# Patient Record
Sex: MALE | Race: WHITE | NOT HISPANIC OR LATINO | URBAN - METROPOLITAN AREA
[De-identification: names, ages, dates, MRNs, and addresses within clinical notes are randomized per-mention and may not be internally consistent; named-entity substitution may affect disease eponyms.]

---

## 2019-08-09 ENCOUNTER — EMERGENCY (EMERGENCY)
Facility: HOSPITAL | Age: 69
LOS: 1 days | Discharge: HOME | End: 2019-08-09
Attending: STUDENT IN AN ORGANIZED HEALTH CARE EDUCATION/TRAINING PROGRAM
Payer: COMMERCIAL

## 2019-08-09 VITALS
RESPIRATION RATE: 18 BRPM | HEIGHT: 69 IN | HEART RATE: 92 BPM | OXYGEN SATURATION: 98 % | SYSTOLIC BLOOD PRESSURE: 133 MMHG | DIASTOLIC BLOOD PRESSURE: 91 MMHG | WEIGHT: 169.98 LBS | TEMPERATURE: 98 F

## 2019-08-09 VITALS
SYSTOLIC BLOOD PRESSURE: 130 MMHG | RESPIRATION RATE: 20 BRPM | HEART RATE: 78 BPM | OXYGEN SATURATION: 99 % | DIASTOLIC BLOOD PRESSURE: 92 MMHG

## 2019-08-09 LAB
ALBUMIN SERPL ELPH-MCNC: 4 G/DL — SIGNIFICANT CHANGE UP (ref 3.5–5)
ALP SERPL-CCNC: 188 U/L — HIGH (ref 40–120)
ALT FLD-CCNC: 246 U/L DA — HIGH (ref 10–60)
ANION GAP SERPL CALC-SCNC: 10 MMOL/L — SIGNIFICANT CHANGE UP (ref 5–17)
AST SERPL-CCNC: 170 U/L — HIGH (ref 10–40)
BASOPHILS # BLD AUTO: 0.03 K/UL — SIGNIFICANT CHANGE UP (ref 0–0.2)
BASOPHILS NFR BLD AUTO: 0.4 % — SIGNIFICANT CHANGE UP (ref 0–2)
BILIRUB SERPL-MCNC: 4.7 MG/DL — HIGH (ref 0.2–1.2)
BUN SERPL-MCNC: 14 MG/DL — SIGNIFICANT CHANGE UP (ref 7–18)
CALCIUM SERPL-MCNC: 9.1 MG/DL — SIGNIFICANT CHANGE UP (ref 8.4–10.5)
CHLORIDE SERPL-SCNC: 101 MMOL/L — SIGNIFICANT CHANGE UP (ref 96–108)
CO2 SERPL-SCNC: 24 MMOL/L — SIGNIFICANT CHANGE UP (ref 22–31)
CREAT SERPL-MCNC: 1.42 MG/DL — HIGH (ref 0.5–1.3)
EOSINOPHIL # BLD AUTO: 0.35 K/UL — SIGNIFICANT CHANGE UP (ref 0–0.5)
EOSINOPHIL NFR BLD AUTO: 4.7 % — SIGNIFICANT CHANGE UP (ref 0–6)
GLUCOSE SERPL-MCNC: 99 MG/DL — SIGNIFICANT CHANGE UP (ref 70–99)
HCT VFR BLD CALC: 44.3 % — SIGNIFICANT CHANGE UP (ref 39–50)
HGB BLD-MCNC: 15.4 G/DL — SIGNIFICANT CHANGE UP (ref 13–17)
IMM GRANULOCYTES NFR BLD AUTO: 0.4 % — SIGNIFICANT CHANGE UP (ref 0–1.5)
LIDOCAIN IGE QN: 130 U/L — SIGNIFICANT CHANGE UP (ref 73–393)
LYMPHOCYTES # BLD AUTO: 0.67 K/UL — LOW (ref 1–3.3)
LYMPHOCYTES # BLD AUTO: 9 % — LOW (ref 13–44)
MCHC RBC-ENTMCNC: 31.4 PG — SIGNIFICANT CHANGE UP (ref 27–34)
MCHC RBC-ENTMCNC: 34.8 GM/DL — SIGNIFICANT CHANGE UP (ref 32–36)
MCV RBC AUTO: 90.2 FL — SIGNIFICANT CHANGE UP (ref 80–100)
MONOCYTES # BLD AUTO: 0.74 K/UL — SIGNIFICANT CHANGE UP (ref 0–0.9)
MONOCYTES NFR BLD AUTO: 9.9 % — SIGNIFICANT CHANGE UP (ref 2–14)
NEUTROPHILS # BLD AUTO: 5.65 K/UL — SIGNIFICANT CHANGE UP (ref 1.8–7.4)
NEUTROPHILS NFR BLD AUTO: 75.6 % — SIGNIFICANT CHANGE UP (ref 43–77)
NRBC # BLD: 0 /100 WBCS — SIGNIFICANT CHANGE UP (ref 0–0)
PLATELET # BLD AUTO: 144 K/UL — LOW (ref 150–400)
POTASSIUM SERPL-MCNC: 3.8 MMOL/L — SIGNIFICANT CHANGE UP (ref 3.5–5.3)
POTASSIUM SERPL-SCNC: 3.8 MMOL/L — SIGNIFICANT CHANGE UP (ref 3.5–5.3)
PROT SERPL-MCNC: 8.2 G/DL — SIGNIFICANT CHANGE UP (ref 6–8.3)
RBC # BLD: 4.91 M/UL — SIGNIFICANT CHANGE UP (ref 4.2–5.8)
RBC # FLD: 12.6 % — SIGNIFICANT CHANGE UP (ref 10.3–14.5)
SODIUM SERPL-SCNC: 135 MMOL/L — SIGNIFICANT CHANGE UP (ref 135–145)
WBC # BLD: 7.47 K/UL — SIGNIFICANT CHANGE UP (ref 3.8–10.5)
WBC # FLD AUTO: 7.47 K/UL — SIGNIFICANT CHANGE UP (ref 3.8–10.5)

## 2019-08-09 PROCEDURE — 85027 COMPLETE CBC AUTOMATED: CPT

## 2019-08-09 PROCEDURE — 99284 EMERGENCY DEPT VISIT MOD MDM: CPT | Mod: 25

## 2019-08-09 PROCEDURE — 83690 ASSAY OF LIPASE: CPT

## 2019-08-09 PROCEDURE — 76705 ECHO EXAM OF ABDOMEN: CPT | Mod: 26

## 2019-08-09 PROCEDURE — 80053 COMPREHEN METABOLIC PANEL: CPT

## 2019-08-09 PROCEDURE — 99284 EMERGENCY DEPT VISIT MOD MDM: CPT

## 2019-08-09 PROCEDURE — 36415 COLL VENOUS BLD VENIPUNCTURE: CPT

## 2019-08-09 PROCEDURE — 76705 ECHO EXAM OF ABDOMEN: CPT

## 2019-08-09 RX ORDER — ROSUVASTATIN CALCIUM 5 MG/1
0 TABLET ORAL
Qty: 0 | Refills: 0 | DISCHARGE

## 2019-08-09 RX ORDER — DOLUTEGRAVIR SODIUM 25 MG/1
0 TABLET, FILM COATED ORAL
Qty: 0 | Refills: 0 | DISCHARGE

## 2019-08-09 RX ORDER — CLONAZEPAM 1 MG
0 TABLET ORAL
Qty: 0 | Refills: 0 | DISCHARGE

## 2019-08-09 RX ORDER — QUETIAPINE FUMARATE 200 MG/1
0 TABLET, FILM COATED ORAL
Qty: 0 | Refills: 0 | DISCHARGE

## 2019-08-09 RX ORDER — EMTRICITABINE AND TENOFOVIR DISOPROXIL FUMARATE 200; 300 MG/1; MG/1
0 TABLET, FILM COATED ORAL
Qty: 0 | Refills: 0 | DISCHARGE

## 2019-08-09 NOTE — ED PROVIDER NOTE - PROGRESS NOTE DETAILS
patient lft elevated and dilated cbd. no jaundice on exam. patient abd remains soft, nontender. patient endorses that he noted a stone on his last bm when hisabd pain resolved. likely passed cbd stone. instructed to f.u gi. return precaution for jaudice, fever, n, v, or worsening pain instructed

## 2019-08-09 NOTE — ED PROVIDER NOTE - OBJECTIVE STATEMENT
70 y/o male with PMHx of HLD presents to the ED c/o RUQ abd pain x 3 days. Pt notes associated nausea, constipation and fever (t-max 100). Pt notes pain is worse with eating. Pt able to pass gas. No prior abd surgeries. Pt denies vomiting, diarrhea, cough, or any other complaints. NKDA.

## 2021-09-13 ENCOUNTER — TELEPHONE (OUTPATIENT)
Dept: UROLOGY | Facility: MEDICAL CENTER | Age: 71
End: 2021-09-13

## 2021-09-13 ENCOUNTER — APPOINTMENT (EMERGENCY)
Dept: CT IMAGING | Facility: HOSPITAL | Age: 71
End: 2021-09-13
Payer: MEDICARE

## 2021-09-13 ENCOUNTER — HOSPITAL ENCOUNTER (EMERGENCY)
Facility: HOSPITAL | Age: 71
Discharge: HOME | End: 2021-09-13
Attending: EMERGENCY MEDICINE
Payer: MEDICARE

## 2021-09-13 VITALS
TEMPERATURE: 98.9 F | SYSTOLIC BLOOD PRESSURE: 157 MMHG | WEIGHT: 168 LBS | DIASTOLIC BLOOD PRESSURE: 90 MMHG | HEART RATE: 86 BPM | OXYGEN SATURATION: 98 % | RESPIRATION RATE: 19 BRPM

## 2021-09-13 DIAGNOSIS — N23 RENAL COLIC ON RIGHT SIDE: ICD-10-CM

## 2021-09-13 DIAGNOSIS — R10.9 RIGHT FLANK PAIN: Primary | ICD-10-CM

## 2021-09-13 DIAGNOSIS — K59.00 CONSTIPATION: ICD-10-CM

## 2021-09-13 LAB
ANION GAP SERPL CALCULATED.3IONS-SCNC: 11 MMOL/L (ref 4–13)
APTT PPP: 28 SECONDS (ref 23–37)
BACTERIA UR QL AUTO: ABNORMAL /HPF
BASOPHILS # BLD AUTO: 0.06 THOUSANDS/ΜL (ref 0–0.1)
BASOPHILS NFR BLD AUTO: 1 % (ref 0–1)
BILIRUB UR QL STRIP: NEGATIVE
BUN SERPL-MCNC: 11 MG/DL (ref 5–25)
CALCIUM SERPL-MCNC: 9.2 MG/DL (ref 8.3–10.1)
CHLORIDE SERPL-SCNC: 97 MMOL/L (ref 100–108)
CLARITY UR: CLEAR
CO2 SERPL-SCNC: 27 MMOL/L (ref 21–32)
COLOR UR: YELLOW
CREAT SERPL-MCNC: 1.2 MG/DL (ref 0.6–1.3)
EOSINOPHIL # BLD AUTO: 0.01 THOUSAND/ΜL (ref 0–0.61)
EOSINOPHIL NFR BLD AUTO: 0 % (ref 0–6)
ERYTHROCYTE [DISTWIDTH] IN BLOOD BY AUTOMATED COUNT: 13.1 % (ref 11.6–15.1)
GFR SERPL CREATININE-BSD FRML MDRD: 60 ML/MIN/1.73SQ M
GLUCOSE SERPL-MCNC: 110 MG/DL (ref 65–140)
GLUCOSE UR STRIP-MCNC: NEGATIVE MG/DL
HCT VFR BLD AUTO: 44.8 % (ref 36.5–49.3)
HGB BLD-MCNC: 15.5 G/DL (ref 12–17)
HGB UR QL STRIP.AUTO: ABNORMAL
IMM GRANULOCYTES # BLD AUTO: 0.03 THOUSAND/UL (ref 0–0.2)
IMM GRANULOCYTES NFR BLD AUTO: 0 % (ref 0–2)
INR PPP: 1.1 (ref 0.84–1.19)
KETONES UR STRIP-MCNC: NEGATIVE MG/DL
LEUKOCYTE ESTERASE UR QL STRIP: ABNORMAL
LYMPHOCYTES # BLD AUTO: 1.14 THOUSANDS/ΜL (ref 0.6–4.47)
LYMPHOCYTES NFR BLD AUTO: 14 % (ref 14–44)
MCH RBC QN AUTO: 31.3 PG (ref 26.8–34.3)
MCHC RBC AUTO-ENTMCNC: 34.6 G/DL (ref 31.4–37.4)
MCV RBC AUTO: 90 FL (ref 82–98)
MONOCYTES # BLD AUTO: 0.77 THOUSAND/ΜL (ref 0.17–1.22)
MONOCYTES NFR BLD AUTO: 10 % (ref 4–12)
MUCOUS THREADS UR QL AUTO: ABNORMAL
NEUTROPHILS # BLD AUTO: 6.11 THOUSANDS/ΜL (ref 1.85–7.62)
NEUTS SEG NFR BLD AUTO: 75 % (ref 43–75)
NITRITE UR QL STRIP: NEGATIVE
NON-SQ EPI CELLS URNS QL MICRO: ABNORMAL /HPF
NRBC BLD AUTO-RTO: 0 /100 WBCS
PH UR STRIP.AUTO: 6 [PH]
PLATELET # BLD AUTO: 210 THOUSANDS/UL (ref 149–390)
PMV BLD AUTO: 8.8 FL (ref 8.9–12.7)
POTASSIUM SERPL-SCNC: 3.9 MMOL/L (ref 3.5–5.3)
PROT UR STRIP-MCNC: NEGATIVE MG/DL
PROTHROMBIN TIME: 13.7 SECONDS (ref 11.6–14.5)
RBC # BLD AUTO: 4.96 MILLION/UL (ref 3.88–5.62)
RBC #/AREA URNS AUTO: ABNORMAL /HPF
SODIUM SERPL-SCNC: 135 MMOL/L (ref 136–145)
SP GR UR STRIP.AUTO: 1.01 (ref 1–1.03)
UROBILINOGEN UR QL STRIP.AUTO: 0.2 E.U./DL
WBC # BLD AUTO: 8.12 THOUSAND/UL (ref 4.31–10.16)
WBC #/AREA URNS AUTO: ABNORMAL /HPF

## 2021-09-13 PROCEDURE — 85025 COMPLETE CBC W/AUTO DIFF WBC: CPT | Performed by: PHYSICIAN ASSISTANT

## 2021-09-13 PROCEDURE — 96361 HYDRATE IV INFUSION ADD-ON: CPT

## 2021-09-13 PROCEDURE — 96374 THER/PROPH/DIAG INJ IV PUSH: CPT

## 2021-09-13 PROCEDURE — 99285 EMERGENCY DEPT VISIT HI MDM: CPT | Performed by: PHYSICIAN ASSISTANT

## 2021-09-13 PROCEDURE — 80048 BASIC METABOLIC PNL TOTAL CA: CPT | Performed by: PHYSICIAN ASSISTANT

## 2021-09-13 PROCEDURE — 85610 PROTHROMBIN TIME: CPT | Performed by: PHYSICIAN ASSISTANT

## 2021-09-13 PROCEDURE — 99284 EMERGENCY DEPT VISIT MOD MDM: CPT

## 2021-09-13 PROCEDURE — 85730 THROMBOPLASTIN TIME PARTIAL: CPT | Performed by: PHYSICIAN ASSISTANT

## 2021-09-13 PROCEDURE — 36415 COLL VENOUS BLD VENIPUNCTURE: CPT | Performed by: PHYSICIAN ASSISTANT

## 2021-09-13 PROCEDURE — 81001 URINALYSIS AUTO W/SCOPE: CPT | Performed by: PHYSICIAN ASSISTANT

## 2021-09-13 PROCEDURE — 96375 TX/PRO/DX INJ NEW DRUG ADDON: CPT

## 2021-09-13 PROCEDURE — 74177 CT ABD & PELVIS W/CONTRAST: CPT

## 2021-09-13 RX ORDER — LACTULOSE 20 G/30ML
20 SOLUTION ORAL 2 TIMES DAILY PRN
Qty: 300 ML | Refills: 0 | Status: SHIPPED | OUTPATIENT
Start: 2021-09-13

## 2021-09-13 RX ORDER — OXYCODONE HYDROCHLORIDE AND ACETAMINOPHEN 5; 325 MG/1; MG/1
1 TABLET ORAL EVERY 6 HOURS PRN
Qty: 12 TABLET | Refills: 0 | Status: SHIPPED | OUTPATIENT
Start: 2021-09-13 | End: 2021-09-16

## 2021-09-13 RX ORDER — OXYCODONE HYDROCHLORIDE AND ACETAMINOPHEN 5; 325 MG/1; MG/1
1 TABLET ORAL EVERY 6 HOURS PRN
Qty: 12 TABLET | Refills: 0 | Status: SHIPPED | OUTPATIENT
Start: 2021-09-13 | End: 2021-09-13 | Stop reason: ALTCHOICE

## 2021-09-13 RX ORDER — FENTANYL CITRATE 50 UG/ML
50 INJECTION, SOLUTION INTRAMUSCULAR; INTRAVENOUS ONCE
Status: COMPLETED | OUTPATIENT
Start: 2021-09-13 | End: 2021-09-13

## 2021-09-13 RX ORDER — ONDANSETRON 4 MG/1
4 TABLET, ORALLY DISINTEGRATING ORAL EVERY 6 HOURS PRN
Qty: 20 TABLET | Refills: 0 | Status: SHIPPED | OUTPATIENT
Start: 2021-09-13

## 2021-09-13 RX ORDER — ONDANSETRON 2 MG/ML
4 INJECTION INTRAMUSCULAR; INTRAVENOUS ONCE
Status: COMPLETED | OUTPATIENT
Start: 2021-09-13 | End: 2021-09-13

## 2021-09-13 RX ORDER — KETOROLAC TROMETHAMINE 30 MG/ML
30 INJECTION, SOLUTION INTRAMUSCULAR; INTRAVENOUS ONCE
Status: COMPLETED | OUTPATIENT
Start: 2021-09-13 | End: 2021-09-13

## 2021-09-13 RX ORDER — IBUPROFEN 400 MG/1
400 TABLET ORAL EVERY 6 HOURS PRN
Qty: 30 TABLET | Refills: 0 | Status: SHIPPED | OUTPATIENT
Start: 2021-09-13 | End: 2021-09-18

## 2021-09-13 RX ADMIN — KETOROLAC TROMETHAMINE 30 MG: 30 INJECTION, SOLUTION INTRAMUSCULAR; INTRAVENOUS at 11:28

## 2021-09-13 RX ADMIN — ONDANSETRON 4 MG: 2 INJECTION INTRAMUSCULAR; INTRAVENOUS at 11:27

## 2021-09-13 RX ADMIN — FENTANYL CITRATE 50 MCG: 50 INJECTION, SOLUTION INTRAMUSCULAR; INTRAVENOUS at 13:38

## 2021-09-13 RX ADMIN — IOHEXOL 100 ML: 350 INJECTION, SOLUTION INTRAVENOUS at 12:21

## 2021-09-13 RX ADMIN — SODIUM CHLORIDE 1000 ML: 0.9 INJECTION, SOLUTION INTRAVENOUS at 11:31

## 2021-09-13 NOTE — TELEPHONE ENCOUNTER
Patient seen in the ER today, CT showing non-obstructing right renal stone  Appropriate for next available new patient with AP  Please contact to schedule

## 2021-09-13 NOTE — ED PROVIDER NOTES
History  Chief Complaint   Patient presents with    Flank Pain     R flank pain x 3 weeks  Hx of kidney stones  Also c/o decreased urine output      70 y o  male with past medical history significant for kidney stones presents to ED with chief complaint of right flank pain  Onset of symptoms reported as 3 weeks ago  Location of symptoms reported as intermittent   Quality is reported as dull aching pain  Severity is reported as moderate  Associated symptoms: positive for nausea, denies vomiting, denies fevers  Denies urinary retention, positive for urinary hesitancy  Denies hematuria or dysuria  Positive for constipation   Modifying factors: tried ibuprofen at home without relief  Context: patient reports a history of kidney stones in the past   Reports he thinks he passed a stone 2-3 weeks ago but has be experiencing intermittent right flank pain for the past 3 weeks  Reports no past abdominal surgical history  Previously followed with urologist in [facility] but recently relocated to [place] and has no one established at this time  Previously has ureteral stent placed 8 years ago  Reports he has been told that he has renal cysts in the past    Reviewed past medical history and visits via EPIC:  No prior visits to this ed  History provided by:  Patient   used: No    Flank Pain  Associated symptoms: constipation    Associated symptoms: no chest pain, no chills, no cough, no diarrhea, no dysuria, no fatigue, no fever, no hematuria, no nausea, no shortness of breath, no sore throat and no vomiting        None       History reviewed  No pertinent past medical history  History reviewed  No pertinent surgical history  History reviewed  No pertinent family history  I have reviewed and agree with the history as documented      E-Cigarette/Vaping     E-Cigarette/Vaping Substances     Social History     Tobacco Use    Smoking status: Never Smoker    Smokeless tobacco: Never Used   Substance Use Topics    Alcohol use: Never    Drug use: Never       Review of Systems   Constitutional: Negative for activity change, appetite change, chills, diaphoresis, fatigue, fever and unexpected weight change  HENT: Negative for congestion, dental problem, drooling, ear discharge, ear pain, facial swelling, hearing loss, mouth sores, nosebleeds, postnasal drip, rhinorrhea, sinus pressure, sinus pain, sneezing, sore throat, tinnitus, trouble swallowing and voice change  Eyes: Negative for photophobia, pain, discharge, redness, itching and visual disturbance  Respiratory: Negative for cough, choking, chest tightness, shortness of breath, wheezing and stridor  Cardiovascular: Negative for chest pain, palpitations and leg swelling  Gastrointestinal: Positive for constipation  Negative for abdominal distention, abdominal pain, anal bleeding, blood in stool, diarrhea, nausea, rectal pain and vomiting  Endocrine: Negative for cold intolerance, heat intolerance, polydipsia, polyphagia and polyuria  Genitourinary: Positive for decreased urine volume and flank pain  Negative for difficulty urinating, dysuria, frequency, hematuria, penile swelling, scrotal swelling, testicular pain and urgency  Musculoskeletal: Negative for arthralgias, back pain, gait problem, joint swelling, myalgias, neck pain and neck stiffness  Skin: Negative for color change, pallor, rash and wound  Allergic/Immunologic: Negative for environmental allergies, food allergies and immunocompromised state  Neurological: Negative for dizziness, tremors, seizures, syncope, facial asymmetry, speech difficulty, weakness, light-headedness, numbness and headaches  Hematological: Negative for adenopathy  Does not bruise/bleed easily  Psychiatric/Behavioral: Negative for agitation, confusion and hallucinations  The patient is not nervous/anxious  All other systems reviewed and are negative        Physical Exam  Physical Exam  Vitals and nursing note reviewed  Constitutional:       General: He is not in acute distress  Appearance: Normal appearance  Comments: /79 (BP Location: Left arm)   Pulse (!) 113   Temp 98 9 °F (37 2 °C)   Resp 19   SpO2 98%      HENT:      Head: Normocephalic and atraumatic  Right Ear: External ear normal       Left Ear: External ear normal       Nose: Nose normal    Eyes:      General: No scleral icterus  Right eye: No discharge  Left eye: No discharge  Extraocular Movements: Extraocular movements intact  Conjunctiva/sclera: Conjunctivae normal    Cardiovascular:      Rate and Rhythm: Tachycardia present  Pulses: Normal pulses  Pulmonary:      Effort: Pulmonary effort is normal  No respiratory distress  Abdominal:      Tenderness: There is no abdominal tenderness  There is right CVA tenderness  There is no guarding or rebound  Musculoskeletal:         General: No swelling, tenderness, deformity or signs of injury  Normal range of motion  Cervical back: Normal range of motion and neck supple  No rigidity or tenderness  Skin:     Capillary Refill: Capillary refill takes less than 2 seconds  Coloration: Skin is not jaundiced or pale  Findings: No erythema or rash  Neurological:      General: No focal deficit present  Mental Status: He is alert and oriented to person, place, and time  Mental status is at baseline  Cranial Nerves: No cranial nerve deficit  Sensory: No sensory deficit  Motor: No weakness        Gait: Gait normal    Psychiatric:         Mood and Affect: Mood normal          Behavior: Behavior normal          Vital Signs  ED Triage Vitals [09/13/21 0950]   Temperature Pulse Respirations Blood Pressure SpO2   98 9 °F (37 2 °C) (!) 113 19 140/79 98 %      Temp src Heart Rate Source Patient Position - Orthostatic VS BP Location FiO2 (%)   -- -- Sitting Left arm --      Pain Score       6           Vitals:    09/13/21 0950 09/13/21 1130 09/13/21 1345   BP: 140/79 126/97 168/92   Pulse: (!) 113 94 88   Patient Position - Orthostatic VS: Sitting           Visual Acuity      ED Medications  Medications   ketorolac (TORADOL) injection 30 mg (30 mg Intravenous Given 9/13/21 1128)   ondansetron (ZOFRAN) injection 4 mg (4 mg Intravenous Given 9/13/21 1127)   sodium chloride 0 9 % bolus 1,000 mL (0 mL Intravenous Stopped 9/13/21 1333)   iohexol (OMNIPAQUE) 350 MG/ML injection (SINGLE-DOSE) 100 mL (100 mL Intravenous Given 9/13/21 1221)   fentanyl citrate (PF) 100 MCG/2ML 50 mcg (50 mcg Intravenous Given 9/13/21 1338)       Diagnostic Studies  Results Reviewed     Procedure Component Value Units Date/Time    Urine Microscopic [[id number]]  (Abnormal) Collected: 09/13/21 1121    Lab Status: Final result Specimen: Urine, Clean Catch Updated: 09/13/21 1156     RBC, UA 20-30 /hpf      WBC, UA 2-4 /hpf      Epithelial Cells Occasional /hpf      Bacteria, UA Occasional /hpf      MUCUS THREADS Occasional    Basic metabolic panel [[id number]]  (Abnormal) Collected: 09/13/21 1127    Lab Status: Final result Specimen: Blood from Arm, Right Updated: 09/13/21 1152     Sodium 135 mmol/L      Potassium 3 9 mmol/L      Chloride 97 mmol/L      CO2 27 mmol/L      ANION GAP 11 mmol/L      BUN 11 mg/dL      Creatinine 1 20 mg/dL      Glucose 110 mg/dL      Calcium 9 2 mg/dL      eGFR 60 ml/min/1 73sq m     Narrative:      Sandra guidelines for Chronic Kidney Disease (CKD):     Stage 1 with normal or high GFR (GFR > 90 mL/min/1 73 square meters)    Stage 2 Mild CKD (GFR = 60-89 mL/min/1 73 square meters)    Stage 3A Moderate CKD (GFR = 45-59 mL/min/1 73 square meters)    Stage 3B Moderate CKD (GFR = 30-44 mL/min/1 73 square meters)    Stage 4 Severe CKD (GFR = 15-29 mL/min/1 73 square meters)    Stage 5 End Stage CKD (GFR <15 mL/min/1 73 square meters)  Note: GFR calculation is accurate only with a steady state creatinine Protime-INR [[id number]]  (Normal) Collected: 09/13/21 1127    Lab Status: Final result Specimen: Blood from Arm, Right Updated: 09/13/21 1149     Protime 13 7 seconds      INR 1 10    APTT [[id number]]  (Normal) Collected: 09/13/21 1127    Lab Status: Final result Specimen: Blood from Arm, Right Updated: 09/13/21 1149     PTT 28 seconds     CBC and differential [[id number]]  (Abnormal) Collected: 09/13/21 1127    Lab Status: Final result Specimen: Blood from Arm, Right Updated: 09/13/21 1135     WBC 8 12 Thousand/uL      RBC 4 96 Million/uL      Hemoglobin 15 5 g/dL      Hematocrit 44 8 %      MCV 90 fL      MCH 31 3 pg      MCHC 34 6 g/dL      RDW 13 1 %      MPV 8 8 fL      Platelets 674 Thousands/uL      nRBC 0 /100 WBCs      Neutrophils Relative 75 %      Immat GRANS % 0 %      Lymphocytes Relative 14 %      Monocytes Relative 10 %      Eosinophils Relative 0 %      Basophils Relative 1 %      Neutrophils Absolute 6 11 Thousands/µL      Immature Grans Absolute 0 03 Thousand/uL      Lymphocytes Absolute 1 14 Thousands/µL      Monocytes Absolute 0 77 Thousand/µL      Eosinophils Absolute 0 01 Thousand/µL      Basophils Absolute 0 06 Thousands/µL     UA w Reflex to Microscopic w Reflex to Culture [[id number]]  (Abnormal) Collected: 09/13/21 1121    Lab Status: Final result Specimen: Urine, Clean Catch Updated: 09/13/21 1128     Color, UA Yellow     Clarity, UA Clear     Specific Gravity, UA 1 015     pH, UA 6 0     Leukocytes, UA Trace     Nitrite, UA Negative     Protein, UA Negative mg/dl      Glucose, UA Negative mg/dl      Ketones, UA Negative mg/dl      Urobilinogen, UA 0 2 E U /dl      Bilirubin, UA Negative     Blood, UA Large                 CT abdomen pelvis with contrast   Final Result by Mario Feliciano DO (09/13 1328)      No findings to explain patient's pain  Nonobstructing right renal calculi, diverticulosis, cholelithiasis, and additional findings as above        Workstation performed: [id number] Procedures  Procedures         ED Course  ED Course as of Sep 13 1406   Mon Sep 13, 2021   1136 Reviewed  CBC demonstrates white blood cell count normal at 8 1, hemoglobin of 15 hematocrit 44 normal   No anemia  CBC and differential(!)   1136 Urinalysis reviewed  Trace leukocytes  Large blood  Negative for nitrites  UA w Reflex to Microscopic w Reflex to Culture(!)   1212 BUN of 11 creatinine 1 20 normal   No renal failure  Basic metabolic panel(!)   5678 INR 1 0  No coagulopathy  Protime-INR                             SBIRT 20yo+      Most Recent Value   SBIRT (22 yo +)   In order to provide better care to our patients, we are screening all of our patients for alcohol and drug use  Would it be okay to ask you these screening questions? Yes Filed at: 09/13/2021 1132   Initial Alcohol Screen: US AUDIT-C    1  How often do you have a drink containing alcohol?  0 Filed at: 09/13/2021 1132   2  How many drinks containing alcohol do you have on a typical day you are drinking? 0 Filed at: 09/13/2021 1132   3a  Male UNDER 65: How often do you have five or more drinks on one occasion? 0 Filed at: 09/13/2021 1132   3b  FEMALE Any Age, or MALE 65+: How often do you have 4 or more drinks on one occassion? 0 Filed at: 09/13/2021 1132   Audit-C Score  0 Filed at: 09/13/2021 1132   PHANI: How many times in the past year have you    Used an illegal drug or used a prescription medication for non-medical reasons? Never Filed at: 09/13/2021 1132                    MDM  Number of Diagnoses or Management Options  Constipation: new and requires workup  Renal colic on right side: new and requires workup  Right flank pain: new and requires workup  Diagnosis management comments: MDM    ddx includes but is not limited to pyelonephritis, kidney stone, intestinal sources including diverticulitis or appendicitis, biliary colic, cholecystitis, mesenteric ischemia, shingles, urinary tract infection, musculoskeletal back pain, consider ectopic pregnancy, ovarian torsion, dysmenorrhea, or ovarian cyst    Ct abd/pelvis images independently visualized and interpreted by me  Radiology report reviewed: ABDOMEN     LOWER CHEST:  No clinically significant abnormality identified in the visualized lower chest   Coronary artery calcification  LIVER/BILIARY TREE:  One or more subcentimeter sharply circumscribed low-density hepatic lesion(s) are noted, too small to accurately characterize, but statistically most likely to represent subcentimeter hepatic cysts   No suspicious solid hepatic   lesion is identified   Hepatic contours are normal   No biliary dilatation  GALLBLADDER:  There are gallstone(s) within the gallbladder, without pericholecystic inflammatory changes  SPLEEN:  Unremarkable  PANCREAS:  Unremarkable  ADRENAL GLANDS:  Unremarkable  KIDNEYS/URETERS:  6 mm right lower pole renal calculus   Additional smaller renal calculi   No hydronephrosis   Greater than 10 cm left lower pole renal cyst   Additional bilateral cysts and too small to accurately characterize hypodensities  STOMACH AND BOWEL: Garlan Cal is colonic diverticulosis without evidence of acute diverticulitis   Small hiatal hernia  APPENDIX:  No findings to suggest appendicitis  ABDOMINOPELVIC CAVITY:  No ascites   No pneumoperitoneum   No lymphadenopathy  VESSELS:  Atherosclerotic changes are present   No evidence of aneurysm  PELVIS     REPRODUCTIVE ORGANS:  The prostate is enlarged        URINARY BLADDER:  Unremarkable  ABDOMINAL WALL/INGUINAL REGIONS:  Bilateral fat-containing inguinal hernias   Small lipoma in the right gluteus maximums  OSSEOUS STRUCTURES:  No acute fracture or destructive osseous lesion  Impression:      No findings to explain patient's pain  Nonobstructing right renal calculi, diverticulosis, cholelithiasis, and additional findings as above             Amount and/or Complexity of Data Reviewed  Clinical lab tests: ordered and reviewed  Tests in the radiology section of CPT®: ordered and reviewed  Discussion of test results with the performing providers: yes  Review and summarize past medical records: yes  Independent visualization of images, tracings, or specimens: yes    Risk of Complications, Morbidity, and/or Mortality  General comments: ED coarse:  70year old male with history of kidney stones  Presents with intermittent right flank pain x 3 weeks  Denies acute fall, injury or trauma  Denies fevers  Lab evaluation shows large blood on ua but negative nitrites  Renal function normal   Ct scan showed 6 mm right lower pole renal calculus but no obstructing calculi  Patient aware of 10 cm left lower pole renal cyst  Discussed all findings with patient at bedside  Discussed symptoms appear consistent with renal colic and constipation  Discussed will treat with nsaids,  Add lactulose for constipation  Add percocet for severe pain  No indication for admission  Initial tachycardia likely secondary to pain - resolved with iv fluids  Discussed follow up with pcp and urology in 5-7 days for further evaluation of symptoms  Reviewed reasons to return to ed  Standard narcotic precautions given  Reviewed reasons to return to ed  Patient verbalized understanding of diagnosis and agreement with discharge plan of care as well as understanding of reasons to return to ed       I have reasonably determine that electronically prescribing a controlled substance would be impractical for the patient to obtain the controlled substance prescribed by electronic prescription or would cause an untimely delay resulting in an adverse impact on the patient's medical condition        Patient was seen during the outbreak of the corona virus epidemic   Resources are limited due to the severity of patient illnesses associated with virus   Testing is also limited at this time   Discussed with patient at the time of this evaluation   Due to the fact that limited resources are available -treatment options are limited  Disposition  Final diagnoses:   Right flank pain   Constipation   Renal colic on right side     Time reflects when diagnosis was documented in both MDM as applicable and the Disposition within this note     Time User Action Codes Description Comment    9/13/2021  1:55 PM Bel Alton Primas Add [R10 9] Right flank pain     9/13/2021  1:55 PM Bel Alton Primas Add [U98 17] Biliary colic     0/53/7098  4:21 PM Bel Alton Primas Add [K59 00] Constipation     9/13/2021  1:58 PM Bel Alton Primas Remove [A47 78] Biliary colic     4/88/6399  2:40 PM Bel Alton Primas Add [F62] Renal colic on right side       ED Disposition     ED Disposition Condition Date/Time Comment    Discharge Stable Mon Sep 13, 2021  1:55 PM Latha Lanza discharge to home/self care              Follow-up Information     Follow up With Specialties Details Why Contact Info Additional [address] [facility] Emergency Department Emergency Medicine Go to  If symptoms worsen [address] Emergency Department, [address] [place], [place],   [address], [address] [facility], Nurse Practitioner Call in 2 days for further evaluation of symptoms [address] [facility] 5766       Abran Palacios MD Urology Call in 2 days for further evaluation of symptoms [address]  [address] [place] [address]  [phone]             Patient's Medications   Discharge Prescriptions    IBUPROFEN (MOTRIN) 400 MG TABLET    Take 1 tablet (400 mg total) by mouth every 6 (six) hours as needed for moderate pain (flank pain/initial rx ) for up to 5 days       Start Date: 9/13/2021 End Date: 9/18/2021       Order Dose: 400 mg       Quantity: 30 tablet    Refills: 0    LACTULOSE 20 G/30 ML Take 30 mL (20 g total) by mouth 2 (two) times a day as needed (constipation)       Start Date: 9/13/2021 End Date: --       Order Dose: 20 g       Quantity: 300 mL    Refills: 0    ONDANSETRON (ZOFRAN-ODT) 4 MG DISINTEGRATING TABLET    Take 1 tablet (4 mg total) by mouth every 6 (six) hours as needed for nausea or vomiting for up to 20 doses       Start Date: 9/13/2021 End Date: --       Order Dose: 4 mg       Quantity: 20 tablet    Refills: 0    OXYCODONE-ACETAMINOPHEN (PERCOCET) 5-325 MG PER TABLET    Take 1 tablet by mouth every 6 (six) hours as needed for severe pain (renal colic/initial rx ) for up to 3 days Label no driving no etoh  Initial rx  Dx:Max Daily Amount: 4 tablets       Start Date: 9/13/2021 End Date: 9/16/2021       Order Dose: 1 tablet       Quantity: 12 tablet    Refills: 0     No discharge procedures on file      PDMP Review     None          ED Provider  Electronically Signed by           Juan Manuel Hennessy PA-C  09/13/21 [id number]

## 2021-09-13 NOTE — TELEPHONE ENCOUNTER
Complaint/diagnosis: Kidney stones    Insurance:Medicare    History of Cancer:    Previous Urologist:Yes  8 years ago in [facility]    Outside testing/where:no    Records requested/where:Patient could not remember name      Preferred Location:[place]

## 2021-09-15 NOTE — PROGRESS NOTES
9/16/2021      Chief Complaint   Patient presents with    Flank Pain       Assessment and Plan    70 y o  male -- new patient    1  Right Flank Pain x 3 weeks  - Was seen in ER 9/13/21   - CT showing 6 mm right lower pole renal calculus, no obstructing stones present  - Discussed with patient flank pain unlikely to be from non-obstructing stone  - Discussed observation vs ESWL   - Pain is intermittent and dull, may be due to recent stone passage  - Pain relieved with ibuprofen and heating pad  - Will follow up in 1 month with KUB and US prior to reassess stones  - Will call in the meantime with questions or concerns  - All questions answered; patient understands and agrees with plan    2  Stable left renal cyst  - Seen on recent CT    3  LUTS  - Patient states he has urinary frequency and nocturia x 1-2  - Interested in starting Flomax at this time; medication and side effects reviewed  - Prescription sent to pharmacy  - Will reassess at follow up    4  Routine prostate cancer screening  - Done with PCP      History of Present Illness  Viki Julian is a 70 y o  male new patient here for evaluation of right flank pain  Patient has history of nephrolithiasis many years ago and follows with outside urologist for this, however, recently moved to [place]  Patient states he had ureteroscopy with stent placement more than 8 years ago  Denies other surgical intervention for nephrolithiasis  States he has been having right sided intermittent flank pain for the past 3 weeks  Believes he passed stones 2-3 weeks ago, however, pain is persistent  Patient was seen in ER (9/13/21) CT showing stable renal cyst as well as non-obstructing 6 mm right intrarenal stone  No obstructing stones or hydronephrosis present  Denies fevers, chills, nausea, vomiting, gross hematuria  States he is having nocturia x 1-2, frequency  Review of Systems   Constitutional: Negative for activity change, appetite change, chills and fever  HENT: Negative for congestion and trouble swallowing  Respiratory: Negative for cough and shortness of breath  Cardiovascular: Negative for chest pain, palpitations and leg swelling  Gastrointestinal: Negative for abdominal pain, constipation, diarrhea, nausea and vomiting  Genitourinary: Positive for flank pain (Right, intermittent, dull)  Negative for difficulty urinating, dysuria, frequency, hematuria and urgency  Musculoskeletal: Negative for back pain and gait problem  Skin: Negative for wound  Allergic/Immunologic: Negative for immunocompromised state  Neurological: Negative for dizziness and syncope  Hematological: Does not bruise/bleed easily  Psychiatric/Behavioral: Negative for confusion  All other systems reviewed and are negative  Vitals  Vitals:    09/16/21 1257   BP: 142/90   Pulse: 99   Weight: 69 4 kg (153 lb)   Height: 5' 9" (1 753 m)       Physical Exam  Constitutional:       Appearance: Normal appearance  HENT:      Head: Normocephalic  Pulmonary:      Effort: Pulmonary effort is normal    Musculoskeletal:      Cervical back: Normal range of motion  Skin:     General: Skin is warm and dry  Neurological:      General: No focal deficit present  Mental Status: He is alert and oriented to person, place, and time  Psychiatric:         Mood and Affect: Mood normal          Behavior: Behavior normal          Thought Content: Thought content normal          Judgment: Judgment normal            Past History  History reviewed  No pertinent past medical history    Social History     Socioeconomic History    Marital status: /Civil Union     Spouse name: None    Number of children: None    Years of education: None    Highest education level: None   Occupational History    None   Tobacco Use    Smoking status: Never Smoker    Smokeless tobacco: Never Used   Substance and Sexual Activity    Alcohol use: Never    Drug use: Never    Sexual activity: None   Other Topics Concern    None   Social History Narrative    None     Social Determinants of Health     Financial Resource Strain:     Difficulty of Paying Living Expenses:    Food Insecurity:     Worried About Running Out of Food in the Last Year:     [address] in the Last Year:    Transportation Needs:     Lack of Transportation (Medical):  Lack of Transportation (Non-Medical):    Physical Activity:     Days of Exercise per Week:     Minutes of Exercise per Session:    Stress:     Feeling of Stress :    Social Connections:     Frequency of Communication with Friends and Family:     Frequency of Social Gatherings with Friends and Family:     Attends [employer] Services:     Active Member of Clubs or Organizations:     Attends Club or Organization Meetings:     Marital Status:    Intimate Partner Violence:     Fear of Current or Ex-Partner:     Emotionally Abused:     Physically Abused:     Sexually Abused:      Social History     Tobacco Use   Smoking Status Never Smoker   Smokeless Tobacco Never Used     History reviewed  No pertinent family history  The following portions of the patient's history were reviewed and updated as appropriate: allergies, current medications, past medical history, past social history, past surgical history and problem list     Results  No results found for this or any previous visit (from the past 1 hour(s))  ]  No results found for: PSA  Lab Results   Component Value Date    CALCIUM 9 2 09/13/2021    K 3 9 09/13/2021    CO2 27 09/13/2021    CL 97 (L) 09/13/2021    BUN 11 09/13/2021    CREATININE 1 20 09/13/2021     Lab Results   Component Value Date    WBC 8 12 09/13/2021    HGB 15 5 09/13/2021    HCT 44 8 09/13/2021    MCV 90 09/13/2021     09/13/2021       Milli Estes PA-C

## 2021-09-16 ENCOUNTER — OFFICE VISIT (OUTPATIENT)
Dept: UROLOGY | Facility: CLINIC | Age: 71
End: 2021-09-16
Payer: MEDICARE

## 2021-09-16 VITALS
BODY MASS INDEX: 22.66 KG/M2 | DIASTOLIC BLOOD PRESSURE: 90 MMHG | WEIGHT: 153 LBS | HEART RATE: 99 BPM | SYSTOLIC BLOOD PRESSURE: 142 MMHG | HEIGHT: 69 IN

## 2021-09-16 DIAGNOSIS — N18.31 STAGE 3A CHRONIC KIDNEY DISEASE (HCC): ICD-10-CM

## 2021-09-16 DIAGNOSIS — R39.9 LOWER URINARY TRACT SYMPTOMS: Primary | ICD-10-CM

## 2021-09-16 DIAGNOSIS — N20.0 NEPHROLITHIASIS: ICD-10-CM

## 2021-09-16 PROCEDURE — 99204 OFFICE O/P NEW MOD 45 MIN: CPT | Performed by: PHYSICIAN ASSISTANT

## 2021-09-16 RX ORDER — TAMSULOSIN HYDROCHLORIDE 0.4 MG/1
0.4 CAPSULE ORAL
Qty: 30 CAPSULE | Refills: 3 | Status: SHIPPED | OUTPATIENT
Start: 2021-09-16 | End: 2021-11-19

## 2021-09-23 NOTE — TELEPHONE ENCOUNTER
patient stating he has been constipated for the last 1 mos  Pt states he feels bloated, and uncomfortable  patient reports he is still with intermittent flank pain due to the stone that he has  Patient states the flank pain is manageable however does not want to wait until 10/29/21 for a follow up  Pt requesting a sooner appointment  Discussed with patient healthy bowel regime and the importance of fiber intake  Offered pt a sooner appointment  Pt is scheduled for a follow up 9/29/21  Pt assure he will have all necessary testing completed prior to that visit  Please be advised

## 2021-09-23 NOTE — TELEPHONE ENCOUNTER
Patient called in stating he is still not feeling well  Patient stated he passed a stone about two weeks ago but still having a lot of abdomen flank pain and bloating  Patient denies fever or chills at this time   Patient can be reached at [phone]

## 2021-09-24 ENCOUNTER — HOSPITAL ENCOUNTER (OUTPATIENT)
Dept: ULTRASOUND IMAGING | Facility: CLINIC | Age: 71
Discharge: HOME | End: 2021-09-24
Payer: MEDICARE

## 2021-09-24 DIAGNOSIS — N20.0 NEPHROLITHIASIS: ICD-10-CM

## 2021-09-24 PROCEDURE — 76770 US EXAM ABDO BACK WALL COMP: CPT

## 2021-09-28 NOTE — PROGRESS NOTES
9/29/2021      Chief Complaint   Patient presents with    Follow-up         Assessment and Plan  1  Flank Pain  2  Non-obstructing right lower pole stone  3  Left Renal cyst  - Repeat imaging not yet finalized; will call if any changes since CT  - UA today negative for nitrites, leukocytes, positive for trace blood; will send for micro  - Discussed pain unlikely to be cause by lower pole stone  - Discussed potential surgical intervention for lower pole stone vs referral to IR for aspiration of renal cyst; did discuss that either intervention may not relieve symptoms  Risks and benefits of both discussed  - Patient will reach out to PCP for GI workup and treatment of constipation  - Will follow up in the office in 3 months for symptom reassessment, no imaging prior  - Will have repeat imaging in 1 year with KUB and US prior to visit (September 2021)  - Will call with any questions or concerns  - All questions answered; patient understands and agrees with plan    4  LUTS  - Started on Flomax at last visit  - Doing well with this  - Denies side effects      History of Present Illness  Juan Manuel Velásquez is a 70 y o  male patient with history of nephrolithiasis here for evaluation of right flank pain  CT earlier this month showing stable 10 cm left renal cyst and non-obstructing right intrarenal stones as well as small hiatal hernia  Repeat imaging showing no evidence of obstructing uropathy or hydronephrosis  Patient states pain is in the right upper quadrant and improves after a bowel movement  Notes he has a history of constipation  Denies fever, chills, nausea, vomiting  Does note fatigue, states he had a round rash earlier this summer  Unsure if this is lyme disease but plans on following up with PCP  Review of Systems   Constitutional: Negative for activity change, appetite change, chills and fever  HENT: Negative for congestion and trouble swallowing      Respiratory: Negative for cough and shortness of breath  Cardiovascular: Negative for chest pain, palpitations and leg swelling  Gastrointestinal: Negative for abdominal pain, constipation, diarrhea, nausea and vomiting  Genitourinary: Positive for flank pain (Right)  Negative for difficulty urinating, dysuria, frequency, hematuria and urgency  Musculoskeletal: Negative for back pain and gait problem  Skin: Negative for wound  Allergic/Immunologic: Negative for immunocompromised state  Neurological: Negative for dizziness and syncope  Hematological: Does not bruise/bleed easily  Psychiatric/Behavioral: Negative for confusion  All other systems reviewed and are negative  Vitals  Vitals:    09/29/21 0834   BP: 106/80   Pulse: 103   Weight: 70 4 kg (155 lb 3 2 oz)   Height: 5' 9" (1 753 m)       Physical Exam  Constitutional:       Appearance: Normal appearance  HENT:      Head: Normocephalic  Pulmonary:      Effort: Pulmonary effort is normal    Musculoskeletal:      Cervical back: Normal range of motion  Skin:     General: Skin is warm and dry  Neurological:      General: No focal deficit present  Mental Status: He is alert and oriented to person, place, and time  Psychiatric:         Mood and Affect: Mood normal          Behavior: Behavior normal          Thought Content:  Thought content normal          Judgment: Judgment normal            Past History  Past Medical History:   Diagnosis Date    Anxiety     Hypertension     Kidney stone     Lower urinary tract symptoms (LUTS)      Social History     Socioeconomic History    Marital status: /Civil Union     Spouse name: None    Number of children: None    Years of education: None    Highest education level: None   Occupational History    None   Tobacco Use    Smoking status: Never Smoker    Smokeless tobacco: Never Used   Vaping Use    Vaping Use: Never used   Substance and Sexual Activity    Alcohol use: Never    Drug use: Never    Sexual activity: None Other Topics Concern    None   Social History Narrative    None     Social Determinants of Health     Financial Resource Strain:     Difficulty of Paying Living Expenses:    Food Insecurity:     Worried About Running Out of Food in the Last Year:     [address] in the Last Year:    Transportation Needs:     Lack of Transportation (Medical):      Lack of Transportation (Non-Medical):    Physical Activity:     Days of Exercise per Week:     Minutes of Exercise per Session:    Stress:     Feeling of Stress :    Social Connections:     Frequency of Communication with Friends and Family:     Frequency of Social Gatherings with Friends and Family:     Attends [employer] Services:     Active Member of Clubs or Organizations:     Attends Club or Organization Meetings:     Marital Status:    Intimate Partner Violence:     Fear of Current or Ex-Partner:     Emotionally Abused:     Physically Abused:     Sexually Abused:      Social History     Tobacco Use   Smoking Status Never Smoker   Smokeless Tobacco Never Used     Family History   Problem Relation Age of Onset    Heart disease Father     Heart disease Mother        The following portions of the patient's history were reviewed and updated as appropriate: allergies, current medications, past medical history, past social history, past surgical history and problem list     Results  Recent Results (from the past 1 hour(s))   POCT urine dip    Collection Time: 09/29/21  8:43 AM   Result Value Ref Range    LEUKOCYTE ESTERASE,UA -     NITRITE,UA -     SL AMB POCT UROBILINOGEN 0 2     POCT URINE PROTEIN -      PH,UA 6 0     BLOOD,UA trace     SPECIFIC GRAVITY,UA 1 020     [facility] -     GLUCOSE, UA -      COLOR,UA rita     CLARITY,UA clear    ]  No results found for: PSA  Lab Results   Component Value Date    CALCIUM 9 2 09/13/2021    K 3 9 09/13/2021    CO2 27 09/13/2021    CL 97 (L) 09/13/2021    BUN 11 09/13/2021    CREATININE 1 20 09/13/2021     Lab Results   Component Value Date    WBC 8 12 09/13/2021    HGB 15 5 09/13/2021    HCT 44 8 09/13/2021    MCV 90 09/13/2021     09/13/2021       Raheem Flynn, PA-C

## 2021-09-29 ENCOUNTER — OFFICE VISIT (OUTPATIENT)
Dept: UROLOGY | Facility: CLINIC | Age: 71
End: 2021-09-29
Payer: MEDICARE

## 2021-09-29 VITALS
SYSTOLIC BLOOD PRESSURE: 106 MMHG | HEIGHT: 69 IN | BODY MASS INDEX: 22.99 KG/M2 | HEART RATE: 103 BPM | DIASTOLIC BLOOD PRESSURE: 80 MMHG | WEIGHT: 155.2 LBS

## 2021-09-29 DIAGNOSIS — N20.0 NEPHROLITHIASIS: ICD-10-CM

## 2021-09-29 DIAGNOSIS — R39.9 LOWER URINARY TRACT SYMPTOMS: Primary | ICD-10-CM

## 2021-09-29 LAB
BACTERIA UR QL AUTO: ABNORMAL /HPF
BILIRUB UR QL STRIP: NEGATIVE
CLARITY UR: CLEAR
COLOR UR: YELLOW
GLUCOSE UR STRIP-MCNC: NEGATIVE MG/DL
HGB UR QL STRIP.AUTO: NEGATIVE
HYALINE CASTS #/AREA URNS LPF: ABNORMAL /LPF
KETONES UR STRIP-MCNC: NEGATIVE MG/DL
LEUKOCYTE ESTERASE UR QL STRIP: ABNORMAL
NITRITE UR QL STRIP: NEGATIVE
NON-SQ EPI CELLS URNS QL MICRO: ABNORMAL /HPF
PH UR STRIP.AUTO: 6.5 [PH]
PROT UR STRIP-MCNC: NEGATIVE MG/DL
RBC #/AREA URNS AUTO: ABNORMAL /HPF
SL AMB  POCT GLUCOSE, UA: NORMAL
SL AMB LEUKOCYTE ESTERASE,UA: NORMAL
SL AMB POCT BILIRUBIN,UA: NORMAL
SL AMB POCT BLOOD,UA: NORMAL
SL AMB POCT CLARITY,UA: CLEAR
SL AMB POCT COLOR,UA: NORMAL
SL AMB POCT KETONES,UA: NORMAL
SL AMB POCT NITRITE,UA: NORMAL
SL AMB POCT PH,UA: 6
SL AMB POCT SPECIFIC GRAVITY,UA: 1.02
SL AMB POCT URINE PROTEIN: NORMAL
SL AMB POCT UROBILINOGEN: 0.2
SP GR UR STRIP.AUTO: 1.02 (ref 1–1.03)
UROBILINOGEN UR QL STRIP.AUTO: 0.2 E.U./DL
WBC #/AREA URNS AUTO: ABNORMAL /HPF

## 2021-09-29 PROCEDURE — 99214 OFFICE O/P EST MOD 30 MIN: CPT | Performed by: PHYSICIAN ASSISTANT

## 2021-09-29 PROCEDURE — 81001 URINALYSIS AUTO W/SCOPE: CPT | Performed by: PHYSICIAN ASSISTANT

## 2021-09-29 PROCEDURE — 81002 URINALYSIS NONAUTO W/O SCOPE: CPT | Performed by: PHYSICIAN ASSISTANT

## 2021-09-29 RX ORDER — LISINOPRIL 5 MG/1
5 TABLET ORAL DAILY
COMMUNITY

## 2021-09-29 RX ORDER — CLONAZEPAM 0.5 MG/1
0.25 TABLET ORAL
COMMUNITY

## 2021-09-29 RX ORDER — EMTRICITABINE AND TENOFOVIR ALAFENAMIDE 200; 25 MG/1; MG/1
1 TABLET ORAL
COMMUNITY

## 2021-09-29 RX ORDER — QUETIAPINE FUMARATE 50 MG/1
25 TABLET, FILM COATED ORAL
COMMUNITY

## 2021-09-29 RX ORDER — ROSUVASTATIN CALCIUM 10 MG/1
10 TABLET, COATED ORAL DAILY
COMMUNITY

## 2021-09-30 ENCOUNTER — TELEPHONE (OUTPATIENT)
Dept: UROLOGY | Facility: CLINIC | Age: 71
End: 2021-09-30

## 2021-09-30 NOTE — TELEPHONE ENCOUNTER
----- Message from Otto Pagan PA-C sent at 9/29/2021  8:38 PM EDT -----  Please let Sienna Ho know his urine testing was negative for microscopic blood  Thanks!

## 2021-10-07 ENCOUNTER — TELEPHONE (OUTPATIENT)
Dept: OTHER | Facility: OTHER | Age: 71
End: 2021-10-07

## 2021-11-19 DIAGNOSIS — R39.9 LOWER URINARY TRACT SYMPTOMS: ICD-10-CM

## 2021-11-19 RX ORDER — TAMSULOSIN HYDROCHLORIDE 0.4 MG/1
CAPSULE ORAL
Qty: 30 CAPSULE | Refills: 3 | Status: SHIPPED | OUTPATIENT
Start: 2021-11-19

## 2023-08-12 ENCOUNTER — OFFICE VISIT (OUTPATIENT)
Dept: URGENT CARE | Facility: CLINIC | Age: 73
End: 2023-08-12
Payer: MEDICARE

## 2023-08-12 VITALS
RESPIRATION RATE: 18 BRPM | DIASTOLIC BLOOD PRESSURE: 79 MMHG | OXYGEN SATURATION: 99 % | HEART RATE: 105 BPM | TEMPERATURE: 98.9 F | SYSTOLIC BLOOD PRESSURE: 111 MMHG

## 2023-08-12 DIAGNOSIS — L50.9 URTICARIA: Primary | ICD-10-CM

## 2023-08-12 PROCEDURE — 99213 OFFICE O/P EST LOW 20 MIN: CPT | Performed by: PHYSICIAN ASSISTANT

## 2023-08-12 PROCEDURE — G0463 HOSPITAL OUTPT CLINIC VISIT: HCPCS | Performed by: PHYSICIAN ASSISTANT

## 2023-08-12 RX ORDER — PREDNISOLONE ACETATE 10 MG/ML
SUSPENSION/ DROPS OPHTHALMIC
COMMUNITY
Start: 2023-07-20

## 2023-08-12 RX ORDER — PREDNISONE 50 MG/1
50 TABLET ORAL DAILY
Qty: 5 TABLET | Refills: 0 | Status: SHIPPED | OUTPATIENT
Start: 2023-08-12

## 2023-08-12 RX ORDER — DOLUTEGRAVIR SODIUM AND LAMIVUDINE 50; 300 MG/1; MG/1
1 TABLET, FILM COATED ORAL EVERY EVENING
COMMUNITY

## 2023-08-12 NOTE — PROGRESS NOTES
[facility]        NAME: Erum Marquez is a 68 y.o. male  : 1950    MRN: [id number]  DATE: 2023  TIME: 12:51 PM    Assessment and Plan   Urticaria [L50.9]  1. Urticaria  predniSONE 50 mg tablet            Patient Instructions   Take prednisone as prescribed. Can continue Benadryl. Can also add Claritin or Zyrtec. Can apply cool compresses. Should you develop shortness of breath, chest pain, lip or tongue swelling, scratchy throat or difficulty swallowing you should refer to the ER immediately. Follow up with PCP in 3-5 days. Proceed to  ER if symptoms worsen. Chief Complaint     Chief Complaint   Patient presents with   • Urticaria     Patient here with hives that started yesterday that has spread all over him. Patient states he dyed his hair yesterday, so that's the only new thing he used that he can recall. Patient states it is very itchy. Took benadryl this morning at 8am         History of Present Illness       HPI  This is a 42-year-old male presenting with hives since this morning. Patient notes he dyed his hair yesterday and notes this is the only new contact he has had. He denies any new soaps detergents shampoos conditioners. He notes the rash is itchy. He denies shortness of breath, chest pain, lip or tongue swelling, scratchy throat or difficulty swallowing. He took Benadryl at 8 AM this morning with little relief. Review of Systems   Review of Systems   Constitutional: Negative for chills and fever. HENT: Negative for facial swelling. Respiratory: Negative for chest tightness, shortness of breath and wheezing. Cardiovascular: Negative for chest pain. Gastrointestinal: Negative for diarrhea, nausea and vomiting. Skin: Positive for rash.          Current Medications       Current Outpatient Medications:   •  clonazePAM (KlonoPIN) 0.5 mg tablet, Take 0.25 mg by mouth daily at bedtime as needed for seizures, Disp: , Rfl:   •  Dolutegravir-lamiVUDine (Dovato)  MG TABS, Take 1 tablet by mouth every evening, Disp: , Rfl:   •  lisinopril (ZESTRIL) 5 mg tablet, Take 5 mg by mouth daily, Disp: , Rfl:   •  prednisoLONE acetate (PRED FORTE) 1 % ophthalmic suspension, INSTILL 1 DROP INTO RIGHT EYE FOUR TIMES A DAY AS DIRECTED USE AFTER SURGERY, Disp: , Rfl:   •  predniSONE 50 mg tablet, Take 1 tablet (50 mg total) by mouth daily, Disp: 5 tablet, Rfl: 0  •  QUEtiapine (SEROquel) 50 mg tablet, Take 25 mg by mouth daily at bedtime, Disp: , Rfl:   •  rosuvastatin (CRESTOR) 10 MG tablet, Take 10 mg by mouth daily, Disp: , Rfl:   •  celecoxib (CeleBREX) 200 mg capsule, , Disp: , Rfl:   •  Dolutegravir Sodium (TIVICAY PO), Take by mouth daily at bedtime (Patient not taking: Reported on 8/12/2023), Disp: , Rfl:   •  emtricitabine-tenofovir AF (Descovy) 200-25 MG tablet, Take 1 tablet by mouth daily at bedtime (Patient not taking: Reported on 8/12/2023), Disp: , Rfl:   •  ibuprofen (MOTRIN) 400 mg tablet, Take 1 tablet (400 mg total) by mouth every 6 (six) hours as needed for moderate pain (flank pain/initial rx.) for up to 5 days, Disp: 30 tablet, Rfl: 0  •  lactulose 20 g/30 mL, Take 30 mL (20 g total) by mouth 2 (two) times a day as needed (constipation) (Patient not taking: Reported on 8/12/2023), Disp: 300 mL, Rfl: 0  •  ondansetron (ZOFRAN-ODT) 4 mg disintegrating tablet, Take 1 tablet (4 mg total) by mouth every 6 (six) hours as needed for nausea or vomiting for up to 20 doses (Patient not taking: Reported on 8/12/2023), Disp: 20 tablet, Rfl: 0  •  pregabalin (LYRICA) 50 mg capsule, , Disp: , Rfl:   •  tamsulosin (FLOMAX) 0.4 mg, TAKE 1 CAPSULE BY MOUTH EVERY DAY WITH DINNER (Patient not taking: Reported on 8/12/2023), Disp: 30 capsule, Rfl: 3    Current Allergies     Allergies as of 08/12/2023   • (No Known Allergies)            The following portions of the patient's history were reviewed and updated as appropriate: allergies, current medications, past family history, past medical history, past social history, past surgical history and problem list.     Past Medical History:   Diagnosis Date   • Anxiety    • Hypertension    • Kidney stone    • Lower urinary tract symptoms (LUTS)        Past Surgical History:   Procedure Laterality Date   • EXTRACORPOREAL SHOCK WAVE LITHOTRIPSY     • LITHOTRIPSY         Family History   Problem Relation Age of Onset   • Heart disease Father    • Heart disease Mother          Medications have been verified. Objective   /79   Pulse 105   Temp 98.9 °F (37.2 °C)   Resp 18   SpO2 99%        Physical Exam     Physical Exam  Vitals and nursing note reviewed. Constitutional:       Appearance: Normal appearance. Cardiovascular:      Rate and Rhythm: Normal rate and regular rhythm. Pulmonary:      Effort: Pulmonary effort is normal. No respiratory distress. Breath sounds: Normal breath sounds. No stridor. No wheezing, rhonchi or rales. Skin:     Comments: There are urticaria covering the groin, lower extremities bilaterally.

## 2023-08-15 ENCOUNTER — HOSPITAL ENCOUNTER (EMERGENCY)
Facility: HOSPITAL | Age: 73
Discharge: HOME | End: 2023-08-15
Attending: EMERGENCY MEDICINE | Admitting: EMERGENCY MEDICINE
Payer: MEDICARE

## 2023-08-15 VITALS
DIASTOLIC BLOOD PRESSURE: 78 MMHG | HEART RATE: 95 BPM | TEMPERATURE: 98 F | OXYGEN SATURATION: 98 % | SYSTOLIC BLOOD PRESSURE: 141 MMHG | RESPIRATION RATE: 20 BRPM

## 2023-08-15 DIAGNOSIS — T78.40XD ALLERGIC REACTION, SUBSEQUENT ENCOUNTER: ICD-10-CM

## 2023-08-15 DIAGNOSIS — L29.9 ITCHING: ICD-10-CM

## 2023-08-15 DIAGNOSIS — L50.9 URTICARIA: Primary | ICD-10-CM

## 2023-08-15 PROCEDURE — NC001 PR NO CHARGE: Performed by: EMERGENCY MEDICINE

## 2023-08-15 PROCEDURE — 99282 EMERGENCY DEPT VISIT SF MDM: CPT

## 2023-08-15 PROCEDURE — 96372 THER/PROPH/DIAG INJ SC/IM: CPT

## 2023-08-15 PROCEDURE — 99284 EMERGENCY DEPT VISIT MOD MDM: CPT | Performed by: EMERGENCY MEDICINE

## 2023-08-15 RX ORDER — METHYLPREDNISOLONE SODIUM SUCCINATE 125 MG/2ML
62.5 INJECTION, POWDER, LYOPHILIZED, FOR SOLUTION INTRAMUSCULAR; INTRAVENOUS ONCE
Status: COMPLETED | OUTPATIENT
Start: 2023-08-15 | End: 2023-08-15

## 2023-08-15 RX ORDER — FAMOTIDINE 20 MG/1
40 TABLET, FILM COATED ORAL ONCE
Status: COMPLETED | OUTPATIENT
Start: 2023-08-15 | End: 2023-08-15

## 2023-08-15 RX ORDER — FAMOTIDINE 20 MG/1
20 TABLET, FILM COATED ORAL 2 TIMES DAILY
Qty: 28 TABLET | Refills: 0 | Status: SHIPPED | OUTPATIENT
Start: 2023-08-15 | End: 2023-08-29

## 2023-08-15 RX ORDER — PREDNISONE 20 MG/1
TABLET ORAL
Qty: 23 TABLET | Refills: 0 | Status: SHIPPED | OUTPATIENT
Start: 2023-08-15 | End: 2023-08-30

## 2023-08-15 RX ADMIN — FAMOTIDINE 40 MG: 20 TABLET, FILM COATED ORAL at 12:04

## 2023-08-15 RX ADMIN — METHYLPREDNISOLONE SODIUM SUCCINATE 62.5 MG: 125 INJECTION, POWDER, FOR SOLUTION INTRAMUSCULAR; INTRAVENOUS at 12:06

## 2023-08-15 NOTE — ED PROVIDER NOTES
History  Chief Complaint   Patient presents with   • Itching     Pt presents with c/o generalized hives with an unknown source. Went to  and prescribed prednisone, states he is on his last dose and hies are not going away. 60-year-old male history of HIV on antiviral with recently undetected levels presenting with allergic reaction. Patient reports hives and urticaria for the past few days and currently on day 4 of prednisone. Patient reports that he believes it is due to his hair dye since he has had a previous reaction but has not lasted this long. Patient reports compliance with antihistamines and prednisone course. Here today due to persistent symptoms. Patient reports improvement throughout the day but recurrence in the morning. Currently taking steroids in the morning. Denies any other known new exposures. Denies any nausea vomiting diarrhea or any airway involvement including breathing difficulty or oropharyngeal swelling. Denies any other complaints. Chart reviewed. Past Medical History:  No date: Anxiety  No date: Hypertension  No date: Kidney stone  No date: Lower urinary tract symptoms (LUTS)  Family History: non-contributory  Social History            Prior to Admission Medications   Prescriptions Last Dose Informant Patient Reported? Taking?    Dolutegravir Sodium (TIVICAY PO)  Self Yes No   Sig: Take by mouth daily at bedtime   Patient not taking: Reported on 8/12/2023   Dolutegravir-lamiVUDine (Dovato)  MG TABS   Yes No   Sig: Take 1 tablet by mouth every evening   QUEtiapine (SEROquel) 50 mg tablet  Self Yes No   Sig: Take 25 mg by mouth daily at bedtime   celecoxib (CeleBREX) 200 mg capsule   Yes No   Patient not taking: Reported on 8/12/2023   clonazePAM (KlonoPIN) 0.5 mg tablet  Self Yes No   Sig: Take 0.25 mg by mouth daily at bedtime as needed for seizures   emtricitabine-tenofovir AF (Descovy) 200-25 MG tablet  Self Yes No   Sig: Take 1 tablet by mouth daily at bedtime Patient not taking: Reported on 8/12/2023   ibuprofen (MOTRIN) 400 mg tablet  Self No No   Sig: Take 1 tablet (400 mg total) by mouth every 6 (six) hours as needed for moderate pain (flank pain/initial rx.) for up to 5 days   lactulose 20 g/30 mL  Self No No   Sig: Take 30 mL (20 g total) by mouth 2 (two) times a day as needed (constipation)   Patient not taking: Reported on 8/12/2023   lisinopril (ZESTRIL) 5 mg tablet  Self Yes No   Sig: Take 5 mg by mouth daily   ondansetron (ZOFRAN-ODT) 4 mg disintegrating tablet  Self No No   Sig: Take 1 tablet (4 mg total) by mouth every 6 (six) hours as needed for nausea or vomiting for up to 20 doses   Patient not taking: Reported on 8/12/2023   predniSONE 50 mg tablet   No No   Sig: Take 1 tablet (50 mg total) by mouth daily   prednisoLONE acetate (PRED FORTE) 1 % ophthalmic suspension   Yes No   Sig: INSTILL 1 DROP INTO RIGHT EYE FOUR TIMES A DAY AS DIRECTED USE AFTER SURGERY   pregabalin (LYRICA) 50 mg capsule   Yes No   Patient not taking: Reported on 8/12/2023   rosuvastatin (CRESTOR) 10 MG tablet  Self Yes No   Sig: Take 10 mg by mouth daily   tamsulosin (FLOMAX) 0.4 mg   No No   Sig: TAKE 1 CAPSULE BY MOUTH EVERY DAY WITH DINNER   Patient not taking: Reported on 8/12/2023      Facility-Administered Medications: None       Past Medical History:   Diagnosis Date   • Anxiety    • Hypertension    • Kidney stone    • Lower urinary tract symptoms (LUTS)        Past Surgical History:   Procedure Laterality Date   • EXTRACORPOREAL SHOCK WAVE LITHOTRIPSY     • LITHOTRIPSY         Family History   Problem Relation Age of Onset   • Heart disease Father    • Heart disease Mother      I have reviewed and agree with the history as documented.     E-Cigarette/Vaping   • E-Cigarette Use Never User      E-Cigarette/Vaping Substances   • Nicotine No    • THC No    • CBD No    • Flavoring No    • Other No    • Unknown No      Social History     Tobacco Use   • Smoking status: Never   • Smokeless tobacco: Never   Vaping Use   • Vaping Use: Never used   Substance Use Topics   • Alcohol use: Never   • Drug use: Never       Review of Systems   Constitutional: Negative for appetite change, chills, diaphoresis, fever and unexpected weight change. HENT: Negative for congestion and rhinorrhea. Eyes: Negative for photophobia and visual disturbance. Respiratory: Negative for cough, chest tightness and shortness of breath. Cardiovascular: Negative for chest pain, palpitations and leg swelling. Gastrointestinal: Negative for abdominal distention, abdominal pain, blood in stool, constipation, diarrhea, nausea and vomiting. Genitourinary: Negative for dysuria and hematuria. Musculoskeletal: Negative for back pain, joint swelling, neck pain and neck stiffness. Skin: Positive for rash. Negative for color change, pallor and wound. Neurological: Negative for dizziness, syncope, weakness, light-headedness and headaches. Psychiatric/Behavioral: Negative for agitation. All other systems reviewed and are negative. Physical Exam  Physical Exam  Vitals and nursing note reviewed. Constitutional:       General: He is not in acute distress. Appearance: Normal appearance. He is well-developed. He is not ill-appearing, toxic-appearing or diaphoretic. HENT:      Head: Normocephalic and atraumatic. Nose: Nose normal. No congestion or rhinorrhea. Mouth/Throat:      Mouth: Mucous membranes are moist.      Pharynx: Oropharynx is clear. No oropharyngeal exudate or posterior oropharyngeal erythema. Eyes:      General: No scleral icterus. Right eye: No discharge. Left eye: No discharge. Extraocular Movements: Extraocular movements intact. Conjunctiva/sclera: Conjunctivae normal.      Pupils: Pupils are equal, round, and reactive to light. Neck:      Vascular: No JVD. Trachea: No tracheal deviation. Comments: Supple. Normal range of motion. Cardiovascular:      Rate and Rhythm: Normal rate and regular rhythm. Heart sounds: Normal heart sounds. No murmur heard. No friction rub. No gallop. Comments: Normal rate and regular rhythm  Pulmonary:      Effort: Pulmonary effort is normal. No respiratory distress. Breath sounds: Normal breath sounds. No stridor. No wheezing or rales. Comments: Clear to auscultation bilaterally  Chest:      Chest wall: No tenderness. Abdominal:      General: Bowel sounds are normal. There is no distension. Palpations: Abdomen is soft. Tenderness: There is no abdominal tenderness. There is no right CVA tenderness, left CVA tenderness, guarding or rebound. Comments: Soft, nontender, nondistended. Normal bowel sounds throughout   Musculoskeletal:         General: No swelling, tenderness, deformity or signs of injury. Normal range of motion. Cervical back: Normal range of motion and neck supple. No rigidity. No muscular tenderness. Right lower leg: No edema. Left lower leg: No edema. Lymphadenopathy:      Cervical: No cervical adenopathy. Skin:     General: Skin is warm and dry. Coloration: Skin is not pale. Findings: Rash present. No erythema. Comments: Generalized pruritic raised erythematous maculopapular rash   Neurological:      General: No focal deficit present. Mental Status: He is alert. Mental status is at baseline. Sensory: No sensory deficit. Motor: No weakness or abnormal muscle tone. Coordination: Coordination normal.      Gait: Gait normal.      Comments: Alert. Strength and sensation grossly intact. Ambulatory without difficulty at baseline. Psychiatric:         Behavior: Behavior normal.         Thought Content:  Thought content normal.         Vital Signs  ED Triage Vitals [08/15/23 1128]   Temperature Pulse Respirations Blood Pressure SpO2   98 °F (36.7 °C) 95 20 141/78 98 %      Temp Source Heart Rate Source Patient Position - Orthostatic VS BP Location FiO2 (%)   Oral Monitor Sitting Right arm --      Pain Score       --           Vitals:    08/15/23 1128   BP: 141/78   Pulse: 95   Patient Position - Orthostatic VS: Sitting         Visual Acuity  Visual Acuity    Flowsheet Row Most Recent Value   L Pupil Size (mm) 3   R Pupil Size (mm) 3          ED Medications  Medications   methylPREDNISolone sodium succinate (Solu-MEDROL) injection 62.5 mg (62.5 mg Intramuscular Given 8/15/23 1206)   famotidine (PEPCID) tablet 40 mg (40 mg Oral Given 8/15/23 1204)       Diagnostic Studies  Results Reviewed     None                 No orders to display              Procedures  Procedures         ED Course                               SBIRT 20yo+    Flowsheet Row Most Recent Value   Initial Alcohol Screen: US AUDIT-C     1. How often do you have a drink containing alcohol? 0 Filed at: 08/15/2023 1132   2. How many drinks containing alcohol do you have on a typical day you are drinking? 0 Filed at: 08/15/2023 1132   3a. Male UNDER 65: How often do you have five or more drinks on one occasion? 0 Filed at: 08/15/2023 1132   Audit-C Score 0 Filed at: 08/15/2023 1132   PHANI: How many times in the past year have you. .. Used an illegal drug or used a prescription medication for non-medical reasons? Never Filed at: 08/15/2023 1132                    Medical Decision Making  79-year-old male history of HIV on antiviral with recently undetected levels presenting with allergic reaction. Exam consistent with urticaria. Patient been taking steroids and antihistamines for a few days with persistence. Suspect likely due to hair dye. Likely persistent exposure. We will prolong steroid course and add additional medications. No signs or symptoms of anaphylaxis. Discussed results and recommendations. Advised follow up PCP and dermatology. Medication recommendations. Given instructions and return precautions.  Patient/family at bedside acknowledged understanding of all written and verbal instructions and return precautions. Discharged. Risk  Prescription drug management. Disposition  Final diagnoses:   Urticaria   Allergic reaction, subsequent encounter   Itching     Time reflects when diagnosis was documented in both MDM as applicable and the Disposition within this note     Time User Action Codes Description Comment    8/15/2023 11:54 AM Ander Breeze Add [L50.9] Urticaria     8/15/2023 11:54 AM Ander Breeze Add [T78.40XA] Allergic reaction, initial encounter     8/15/2023 11:54 AM Ander Breeze Remove [T78.40XA] Allergic reaction, initial encounter     8/15/2023 11:54 AM Ander Breeze Add [T78.40XD] Allergic reaction, subsequent encounter     8/15/2023 11:54 AM Ander Breeze Add [L29.9] Pruritus     8/15/2023 11:54 AM Ander Breeze Remove [L29.9] Pruritus     8/15/2023 11:54 AM Ander Breeze Add [L29.9] Itching       ED Disposition     ED Disposition   Discharge    Condition   Stable    Date/Time   Tue Aug 15, 2023 11:54 AM    Comment   [facility] discharge to home/self care.                Follow-up Information     Follow up With Specialties Details Why [address]  Call  for [phone]      Rm Portillo MD Dermatology Schedule an appointment as soon as possible for a visit in 3 days  [address] [facility]  [phone]            Discharge Medication List as of 8/15/2023 12:03 PM      START taking these medications    Details   famotidine (PEPCID) 20 mg tablet Take 1 tablet (20 mg total) by mouth 2 (two) times a day for 14 days, Starting Tue 8/15/2023, Until Tue 8/29/2023, Normal      !! predniSONE 20 mg tablet Multiple Dosages:Starting Tue 8/15/2023, Until Thu 8/17/2023 at 2359, THEN Starting Fri 8/18/2023, Until Sun 8/20/2023 at 2359, THEN Starting Mon 8/21/2023, Until Wed 8/23/2023 at 2359, THEN Starting Thu 8/24/2023, Until Sat 8/26/2023 at 2359, THEN  Starting Sun 8/27/2023, Until Tue 8/29/2023 at 2359Take 2.5 tablets (50 mg total) by mouth daily for 3 days, THEN 2 tablets (40 mg total) daily for 3 days, THEN 1.5 tablets (30 mg total) daily for 3 days, THEN 1 tablet (20 mg total) daily for 3 days, TH EN 0.5 tablets (10 mg total) daily for 3 days. , Normal       !! - Potential duplicate medications found. Please discuss with provider.       CONTINUE these medications which have NOT CHANGED    Details   celecoxib (CeleBREX) 200 mg capsule Starting Thu 10/21/2021, Historical Med      clonazePAM (KlonoPIN) 0.5 mg tablet Take 0.25 mg by mouth daily at bedtime as needed for seizures, Historical Med      Dolutegravir Sodium (TIVICAY PO) Take by mouth daily at bedtime, Historical Med      Dolutegravir-lamiVUDine (Dovato)  MG TABS Take 1 tablet by mouth every evening, Historical Med      emtricitabine-tenofovir AF (Descovy) 200-25 MG tablet Take 1 tablet by mouth daily at bedtime, Historical Med      ibuprofen (MOTRIN) 400 mg tablet Take 1 tablet (400 mg total) by mouth every 6 (six) hours as needed for moderate pain (flank pain/initial rx.) for up to 5 days, Starting Mon 9/13/2021, Until Sat 9/18/2021 at 2359, Normal      lactulose 20 g/30 mL Take 30 mL (20 g total) by mouth 2 (two) times a day as needed (constipation), Starting Mon 9/13/2021, Normal      lisinopril (ZESTRIL) 5 mg tablet Take 5 mg by mouth daily, Historical Med      ondansetron (ZOFRAN-ODT) 4 mg disintegrating tablet Take 1 tablet (4 mg total) by mouth every 6 (six) hours as needed for nausea or vomiting for up to 20 doses, Starting Mon 9/13/2021, Normal      prednisoLONE acetate (PRED FORTE) 1 % ophthalmic suspension INSTILL 1 DROP INTO RIGHT EYE FOUR TIMES A DAY AS DIRECTED USE AFTER SURGERY, Historical Med      !! predniSONE 50 mg tablet Take 1 tablet (50 mg total) by mouth daily, Starting Sat 8/12/2023, Normal      pregabalin (LYRICA) 50 mg capsule Starting Thu 10/21/2021, Historical Med      QUEtiapine (SEROquel) 50 mg tablet Take 25 mg by mouth daily at bedtime, Historical Med      rosuvastatin (CRESTOR) 10 MG tablet Take 10 mg by mouth daily, Historical Med      tamsulosin (FLOMAX) 0.4 mg TAKE 1 CAPSULE BY MOUTH EVERY DAY WITH DINNER, Normal       !! - Potential duplicate medications found. Please discuss with provider. No discharge procedures on file.     PDMP Review     None          ED Provider  Electronically Signed by           Kerry Cortez MD  08/15/23 4556

## 2023-08-15 NOTE — DISCHARGE INSTRUCTIONS
Please follow up PCP and dermatology. Please continue taking antihistamines as directed. Recommend tylenol 650 mg and ibuprofen 600 mg every 6 hours as needed for pain. Please return for severe chest pain, significant shortness of breath, severely worsening symptoms, or any other concerning signs or symptoms. Please refer to the following documents for additional instructions and return precautions.